# Patient Record
Sex: FEMALE | Race: WHITE | NOT HISPANIC OR LATINO | Employment: UNEMPLOYED | URBAN - METROPOLITAN AREA
[De-identification: names, ages, dates, MRNs, and addresses within clinical notes are randomized per-mention and may not be internally consistent; named-entity substitution may affect disease eponyms.]

---

## 2019-07-09 ENCOUNTER — OFFICE VISIT (OUTPATIENT)
Dept: URGENT CARE | Facility: CLINIC | Age: 49
End: 2019-07-09

## 2019-07-09 VITALS
TEMPERATURE: 98.3 F | HEIGHT: 61 IN | BODY MASS INDEX: 23.41 KG/M2 | RESPIRATION RATE: 16 BRPM | HEART RATE: 75 BPM | SYSTOLIC BLOOD PRESSURE: 116 MMHG | DIASTOLIC BLOOD PRESSURE: 68 MMHG | OXYGEN SATURATION: 100 % | WEIGHT: 124 LBS

## 2019-07-09 DIAGNOSIS — L03.012 CELLULITIS OF FINGER OF LEFT HAND: Primary | ICD-10-CM

## 2019-07-09 PROCEDURE — 99203 OFFICE O/P NEW LOW 30 MIN: CPT | Performed by: PHYSICIAN ASSISTANT

## 2019-07-09 RX ORDER — FLUOXETINE HYDROCHLORIDE 20 MG/1
CAPSULE ORAL
COMMUNITY
Start: 2019-06-17

## 2019-07-09 RX ORDER — CEPHALEXIN 500 MG/1
500 CAPSULE ORAL EVERY 8 HOURS SCHEDULED
Qty: 21 CAPSULE | Refills: 0 | Status: SHIPPED | OUTPATIENT
Start: 2019-07-09 | End: 2019-07-16

## 2019-07-09 NOTE — PROGRESS NOTES
[facility]        NAME: Agusto Martinez is a 46 y o  female  : 1970    MRN: [id number]  DATE: 2019  TIME: 9:20 AM    Assessment and Plan   Cellulitis of finger of left hand [L03 012]  1  Cellulitis of finger of left hand  cephalexin (KEFLEX) 500 mg capsule         Patient Instructions   Cellulitis of the left 4th digit:   -There is erythema and edema of the entire digit  The digit is warm to the touch  The patient has limited ROM of the digit due to swelling and pain  Will start the patient in Keflex 500mg taken as prescribed  Take with food and a probiotic    -Soak the finger 2-3 times a day, otherwise keep the area clean and dry   -You can take Advil or Tylenol for the pain  -Follow up in 48 hours for a wound check with your PCP  We discussed that if the finger becomes for red, swollen or painful or if she has red streaking onto her hand or upper extremity that she should go to the ED  Monitor for fever as well  Follow up with PCP in 3-5 days  Proceed to  ER if symptoms worsen  Chief Complaint     Chief Complaint   Patient presents with    Wound Check     pt presents with left hand 4th digit wound  States she was weeding and may have gotten a thorn in the dorsal site of finger yesterday; st 5:30 it had swollen and she released the pressure, released discharge; this morning finger swollen and painful, throbbing         History of Present Illness       The patient is a 20-year-old female who presents today for a wound check of the left 4th digit  The patient states that yesterday while weeding her garden she felt a sharp pinching sensation on the dorsal aspect of the DIP joint of her left 4th digit and believes that a thorn from a thistle bush punctured the digit  She states that approximately four hours later there was a pustule over the site with surrounding erythema and edema  She expressed the purulent material and states that she went to bed   This morning she notes that her digit is "throbbing" and painful  There is increased edema and erythema  She denies fever, chills  She has full ROM of the digit without pain but states that there is "tightness"  She has been taking Advil for the pain with moderate relief  She is up to date with her Tdap vaccine  Review of Systems   Review of Systems   Constitutional: Negative for activity change, appetite change, chills, diaphoresis, fatigue and fever  HENT: Negative for facial swelling, sore throat and trouble swallowing  Respiratory: Negative for chest tightness, shortness of breath, wheezing and stridor  Cardiovascular: Negative for chest pain and palpitations  Musculoskeletal: Positive for joint swelling  Negative for arthralgias  Skin: Positive for color change and wound  Negative for rash  Allergic/Immunologic: Negative for environmental allergies, food allergies and immunocompromised state  Neurological: Negative for dizziness and light-headedness  Hematological: Negative for adenopathy  Does not bruise/bleed easily           Current Medications       Current Outpatient Medications:     FLUoxetine (PROzac) 20 mg capsule, , Disp: , Rfl:     cephalexin (KEFLEX) 500 mg capsule, Take 1 capsule (500 mg total) by mouth every 8 (eight) hours for 7 days, Disp: 21 capsule, Rfl: 0    Current Allergies     Allergies as of 07/09/2019 - Reviewed 07/09/2019   Allergen Reaction Noted    Demerol [meperidine] Hives 07/09/2019    Sulfa antibiotics Hives 07/09/2019            The following portions of the patient's history were reviewed and updated as appropriate: allergies, current medications, past family history, past medical history, past social history, past surgical history and problem list      Past Medical History:   Diagnosis Date    Depression     pre menstrual dysmorphic       Past Surgical History:   Procedure Laterality Date    WISDOM TOOTH EXTRACTION         Family History   Problem Relation Age of Onset    Cancer Mother     No Known Problems Father          Medications have been verified  Objective   /68   Pulse 75   Temp 98 3 °F (36 8 °C)   Resp 16   Ht 5' 1" (1 549 m)   Wt 56 2 kg (124 lb)   LMP 06/14/2019 (Approximate)   SpO2 100%   BMI 23 43 kg/m²        Physical Exam     Physical Exam   Constitutional: She appears well-developed and well-nourished  No distress  Cardiovascular: Normal rate, regular rhythm and normal heart sounds  Pulmonary/Chest: Effort normal and breath sounds normal    Neurological: She has normal strength  No sensory deficit  She exhibits normal muscle tone  Gait normal    Skin: Skin is warm, dry and intact  Capillary refill takes less than 2 seconds  No bruising and no ecchymosis noted  She is not diaphoretic  No erythema  Psychiatric: She has a normal mood and affect  Her behavior is normal    Nursing note and vitals reviewed

## 2019-07-09 NOTE — PATIENT INSTRUCTIONS
Cellulitis   WHAT YOU NEED TO KNOW:   Cellulitis is a skin infection caused by bacteria  Cellulitis may go away on its own or you may need treatment  Your healthcare provider may draw a [place] around the outside edges of your cellulitis  If your cellulitis spreads, your healthcare provider will see it outside of the [place]  DISCHARGE INSTRUCTIONS:   Call 911 if:   · You have sudden trouble breathing or chest pain  Return to the emergency department if:   · Your wound gets larger and more painful  · You feel a crackling under your skin when you touch it  · You have purple dots or bumps on your skin, or you see bleeding under your skin  · You have new swelling and pain in your legs  · The red, warm, swollen area gets larger  · You see red streaks coming from the infected area  Contact your healthcare provider if:   · You have a fever  · Your fever or pain does not go away or gets worse  · The area does not get smaller after 2 days of antibiotics  · Your skin is flaking or peeling off  · You have questions or concerns about your condition or care  Medicines:   · Antibiotics  help treat the bacterial infection  · NSAIDs , such as ibuprofen, help decrease swelling, pain, and fever  NSAIDs can cause stomach bleeding or kidney problems in certain people  If you take blood thinner medicine, always ask if NSAIDs are safe for you  Always read the medicine label and follow directions  Do not give these medicines to children under 10months of age without direction from your child's healthcare provider  · Acetaminophen  decreases pain and fever  It is available without a doctor's order  Ask how much to take and how often to take it  Follow directions  Read the labels of all other medicines you are using to see if they also contain acetaminophen, or ask your doctor or pharmacist  Acetaminophen can cause liver damage if not taken correctly   Do not use more than 4 grams (4,000 milligrams) total of acetaminophen in one day  · Take your medicine as directed  Contact your healthcare provider if you think your medicine is not helping or if you have side effects  Tell him or her if you are allergic to any medicine  Keep a list of the medicines, vitamins, and herbs you take  Include the amounts, and when and why you take them  Bring the list or the pill bottles to follow-up visits  Carry your medicine list with you in case of an emergency  Self-care:   · Elevate the area above the level of your heart  as often as you can  This will help decrease swelling and pain  Prop the area on pillows or blankets to keep it elevated comfortably  · Clean the area daily until the wound scabs over  Gently wash the area with soap and water  Pat dry  Use dressings as directed  · Place cool or warm, wet cloths on the area as directed  Use clean cloths and clean water  Leave it on the area until the cloth is room temperature  Pat the area dry with a clean, dry cloth  The cloths may help decrease pain  Prevent cellulitis:   · Do not scratch bug bites or areas of injury  You increase your risk for cellulitis by scratching these areas  · Do not share personal items, such as towels, clothing, and razors  · Clean exercise equipment  with germ-killing  before and after you use it  · Wash your hands often  Use soap and water  Wash your hands after you use the bathroom, change a child's diapers, or sneeze  Wash your hands before you prepare or eat food  Use lotion to prevent dry, cracked skin  · Wear pressure stockings as directed  You may be told to wear the stockings if you have peripheral edema  The stockings improve blood flow and decrease swelling  · Treat athlete's foot  This can help prevent the spread of a bacterial skin infection  Follow up with your healthcare provider within 3 days, or as directed:   Your healthcare provider will check if your cellulitis is getting better  You may need different medicine  Write down your questions so you remember to ask them during your visits  © 2017 [employer] Information is for End User's use only and may not be sold, redistributed or otherwise used for commercial purposes  All illustrations and images included in CareNotes® are the copyrighted property of [employer]  or [employer]  The above information is an  only  It is not intended as medical advice for individual conditions or treatments  Talk to your doctor, nurse or pharmacist before following any medical regimen to see if it is safe and effective for you  Cellulitis of the left 4th digit:   -There is erythema and edema of the entire digit  The digit is warm to the touch  The patient has limited ROM of the digit due to swelling and pain  Will start the patient in Keflex 500mg taken as prescribed  Take with food and a probiotic    -Soak the finger 2-3 times a day, otherwise keep the area clean and dry   -You can take Advil or Tylenol for the pain  -Follow up in 48 hours for a wound check with your PCP  We discussed that if the finger becomes for red, swollen or painful or if she has red streaking onto her hand or upper extremity that she should go to the ED  Monitor for fever as well

## 2021-04-15 ENCOUNTER — IMMUNIZATIONS (OUTPATIENT)
Dept: FAMILY MEDICINE CLINIC | Facility: HOSPITAL | Age: 51
End: 2021-04-15

## 2021-04-15 DIAGNOSIS — Z23 ENCOUNTER FOR IMMUNIZATION: Primary | ICD-10-CM

## 2021-04-15 PROCEDURE — 91301 SARS-COV-2 / COVID-19 MRNA VACCINE (MODERNA) 100 MCG: CPT

## 2021-04-15 PROCEDURE — 0011A SARS-COV-2 / COVID-19 MRNA VACCINE (MODERNA) 100 MCG: CPT

## 2021-05-18 ENCOUNTER — IMMUNIZATIONS (OUTPATIENT)
Dept: FAMILY MEDICINE CLINIC | Facility: HOSPITAL | Age: 51
End: 2021-05-18

## 2021-05-18 DIAGNOSIS — Z23 ENCOUNTER FOR IMMUNIZATION: Primary | ICD-10-CM

## 2021-05-18 PROCEDURE — 0012A SARS-COV-2 / COVID-19 MRNA VACCINE (MODERNA) 100 MCG: CPT

## 2021-05-18 PROCEDURE — 91301 SARS-COV-2 / COVID-19 MRNA VACCINE (MODERNA) 100 MCG: CPT
